# Patient Record
Sex: FEMALE | Race: WHITE | URBAN - METROPOLITAN AREA
[De-identification: names, ages, dates, MRNs, and addresses within clinical notes are randomized per-mention and may not be internally consistent; named-entity substitution may affect disease eponyms.]

---

## 2017-12-05 ENCOUNTER — THERAPY VISIT (OUTPATIENT)
Dept: PHYSICAL THERAPY | Facility: CLINIC | Age: 52
End: 2017-12-05
Payer: COMMERCIAL

## 2017-12-05 DIAGNOSIS — M25.512 LEFT SHOULDER PAIN: Primary | ICD-10-CM

## 2017-12-05 PROCEDURE — 97161 PT EVAL LOW COMPLEX 20 MIN: CPT | Mod: GP | Performed by: PHYSICAL THERAPIST

## 2017-12-05 PROCEDURE — 97110 THERAPEUTIC EXERCISES: CPT | Mod: GP | Performed by: PHYSICAL THERAPIST

## 2017-12-05 NOTE — PROGRESS NOTES
Subjective:    Patient is a 52 year old female presenting with rehab left shoulder hpi.   Bri Barrios is a 52 year old female with a left shoulder condition.  Condition occurred with:  Unknown cause.  Condition occurred: for unknown reasons.  This is a new condition  Pt reports around September 1 2017 she started to have pain in the left upper arm. This continued to increase and she sought the help of Dr Neal on 12-1-2017 and was referred to PT. She did have a MRI that indicates full thickness rotator cuff tear/AC DJD secondary to glenohumeral capsulitis. She swims and paddles and bowls. She does not have a hx of shoulder pain. She can not remember an event that triggered her pain. Her pain is mostly in the upper arm and into the upper trapezius.    Patient reports pain:  Anterior and lateral.  Radiates to:  Upper arm.  Pain is described as aching and is constant and reported as 8/10.  Associated symptoms:  Loss of motion/stiffness, loss of strength and painful arc. Pain is the same all the time.  Symptoms are exacerbated by using arm overhead, using arm at shoulder level, using arm behind back and lifting and relieved by nothing.  Since onset symptoms are gradually worsening.  Special tests:  X-ray.                                                    Objective:    System                   Shoulder Evaluation:  ROM:  AROM:    Flexion:  Left:  90        Abduction:  Left: 60                         PROM:    Flexion:  Left:  95        Extension:  Left:  50      Abduction:  Left:  60        Internal Rotation:  Left:  45 with arm at 45 dg abduction      External Rotation:  Left:  45 wiith arm at 45 dg abduction                Extension/Internal Rotation:  Left:  L5        Pain: pain with all motions    Strength:  not assessed                      Stability Testing:  not assessed      Special Tests:    Left shoulder positive for the following special tests:  Impingement    Palpation:  not assessed      Mobility  Tests:    Glenohumeral anterior left:  Hypomobile    Glenohumeral posterior left:  Hypomobile    Glenohumeral inferior left:  Hypomobile                                             General     ROS    Assessment/Plan:      Patient is a 52 year old female with left side shoulder complaints.    Patient has the following significant findings with corresponding treatment plan.                Diagnosis 1:  L shoulder pain  Pain -  hot/cold therapy and manual therapy  Decreased ROM/flexibility - manual therapy and therapeutic exercise  Decreased joint mobility - manual therapy and therapeutic exercise  Decreased strength - therapeutic exercise and therapeutic activities  Impaired muscle performance - neuro re-education  Decreased function - therapeutic activities    Therapy Evaluation Codes:   1) History comprised of:   Personal factors that impact the plan of care:      None.    Comorbidity factors that impact the plan of care are:      None.     Medications impacting care: None.  2) Examination of Body Systems comprised of:   Body structures and functions that impact the plan of care:      Shoulder.   Activity limitations that impact the plan of care are:      Lifting.  3) Clinical presentation characteristics are:   Stable/Uncomplicated.  4) Decision-Making    Low complexity using standardized patient assessment instrument and/or measureable assessment of functional outcome.  Cumulative Therapy Evaluation is: Low complexity.    Previous and current functional limitations:  (See Goal Flow Sheet for this information)    Short term and Long term goals: (See Goal Flow Sheet for this information)     Communication ability:  Patient appears to be able to clearly communicate and understand verbal and written communication and follow directions correctly.  Treatment Explanation - The following has been discussed with the patient:   RX ordered/plan of care  Anticipated outcomes  Possible risks and side effects  This patient  would benefit from PT intervention to resume normal activities.   Rehab potential is good.    Frequency:  1 X week, once daily  Duration:  for 6 weeks  Discharge Plan:  Achieve all LTG.  Independent in home treatment program.  Reach maximal therapeutic benefit.    Please refer to the daily flowsheet for treatment today, total treatment time and time spent performing 1:1 timed codes.

## 2017-12-05 NOTE — LETTER
[facility] ATHLETIC [facility] PHYSICAL [facility]  [address]  [facility] [address] [phone]    2017    Re: Bri Barrios   :   1965  MRN:  [id number]   REFERRING PHYSICIAN:   Xavier Neal    [facility]    Date of Initial Evaluation:  2017  Visits:  Rxs Used: 1  Reason for Referral:  Left shoulder pain    EVALUATION SUMMARY    Subjective:    Patient is a 52 year old female presenting with rehab left shoulder hpi.   Bri Barrios is a 52 year old female with a left shoulder condition.  Condition occurred with:  Unknown cause.  Condition occurred: for unknown reasons.  This is a new condition  Pt reports around 2017 she started to have pain in the left upper arm. This continued to increase and she sought the help of Dr Neal on 2017 and was referred to PT. She did have a MRI that indicates full thickness rotator cuff tear/AC DJD secondary to glenohumeral capsulitis. She swims and paddles and bowls. She does not have a hx of shoulder pain. She can not remember an event that triggered her pain. Her pain is mostly in the upper arm and into the upper trapezius.    Patient reports pain:  Anterior and lateral.  Radiates to:  Upper arm.  Pain is described as aching and is constant and reported as 8/10.  Associated symptoms:  Loss of motion/stiffness, loss of strength and painful arc. Pain is the same all the time.  Symptoms are exacerbated by using arm overhead, using arm at shoulder level, using arm behind back and lifting and relieved by nothing.  Since onset symptoms are gradually worsening.  Special tests:  X-ray.   Pertinent medical history includes:  History of fractures and migraines.    Other surgeries include:  Orthopedic surgery and other.  Current medications:  Anti-inflammatory.      Primary job tasks include:  Prolonged sitting, lifting and other.                      Objective:    Shoulder Evaluation:  ROM:  AROM:    Flexion:  Left:  90      Abduction:  Left: 60     PROM:    Flexion:  Left:  95        Extension:  Left:  50      Abduction:  Left:  60      Internal Rotation:  Left:  45 with arm at 45 dg abduction      External Rotation:  Left:  45 wiith arm at 45 dg abduction      Extension/Internal Rotation:  Left:  L5        Pain: pain with all motions  Strength:  not assessed  Stability Testing:  not assessed  Special Tests:    Left shoulder positive for the following special tests:  Impingement  Palpation:  not assessed  Mobility Tests:    Glenohumeral anterior left:  Hypomobile    Glenohumeral posterior left:  Hypomobile    Glenohumeral inferior left:  Hypomobile      Assessment/Plan:    Patient is a 52 year old female with left side shoulder complaints.    Patient has the following significant findings with corresponding treatment plan.                Diagnosis 1:  L shoulder pain  Pain -  hot/cold therapy and manual therapy  Decreased ROM/flexibility - manual therapy and therapeutic exercise  Decreased joint mobility - manual therapy and therapeutic exercise  Decreased strength - therapeutic exercise and therapeutic activities  Impaired muscle performance - neuro re-education  Decreased function - therapeutic activities  Therapy Evaluation Codes:   1) History comprised of:   Personal factors that impact the plan of care:      None.    Comorbidity factors that impact the plan of care are:      None.     Medications impacting care: None.  2) Examination of Body Systems comprised of:   Body structures and functions that impact the plan of care:      Shoulder.   Activity limitations that impact the plan of care are:      Lifting.  3) Clinical presentation characteristics are:   Stable/Uncomplicated.  4) Decision-Making    Low complexity using standardized patient assessment instrument and/or measureable assessment of functional outcome.  Cumulative Therapy Evaluation is:  Low complexity.  Previous and current functional limitations:  (See Goal Flow Sheet for this information)    Short term and Long term goals: (See Goal Flow Sheet for this information)   Communication ability:  Patient appears to be able to clearly communicate and understand verbal and written communication and follow directions correctly.  Treatment Explanation - The following has been discussed with the patient:   RX ordered/plan of care  Anticipated outcomes  Possible risks and side effects  This patient would benefit from PT intervention to resume normal activities.   Rehab potential is good.  Frequency:  1 X week, once daily  Duration:  for 6 weeks  Discharge Plan:  Achieve all LTG.  Independent in home treatment program.  Reach maximal therapeutic benefit.      Thank you for your referral.    INQUIRIES  Therapist: Deidra Setele, PT   [facility] PHYSICAL THERAPY  [address] [place] [address] [address]  [facility] [address]  Phone: [phone]  Fax: [phone]

## 2017-12-05 NOTE — MR AVS SNAPSHOT
After Visit Summary   12/5/2017    Bri Barrios    MRN: [id number]           Patient Information     Date Of Birth          1965        Visit Information        Provider Department      12/5/2017 12:30 PM Deidra Steele, PT [facility] Physical Therapy        Today's Diagnoses     Left shoulder pain    -  1       Follow-ups after your visit        Your next 10 appointments already scheduled     Dec 12, 2017  4:30 PM CST   YUDITH Extremity with Deidra Steele PT   [facility] Physical Therapy [facility]    [address]  [facility] [address]   [phone]            Dec 19, 2017  9:20 AM CST   YUDITH Extremity with Deidra Steele PT   [facility] Physical Therapy [facility]    [address]  [facility] [address]   [phone]            Jan 02, 2018  9:20 AM CST   YUDITH Extremity with Deidra Steele PT   [facility] Physical Therapy ([facility]    [address]  [facility] [address]   [phone]              Who to contact     If you have questions or need follow up information about today's clinic visit or your schedule please contact [place] [facility] PHYSICAL THERAPY directly at [phone].  Normal or non-critical lab and imaging results will be communicated to you by MyChart, letter or phone within 4 business days after the clinic has received the results. If you do not hear from us within 7 days, please contact the clinic through MyChart or phone. If you have a critical or abnormal lab result, we will notify you by phone as soon as possible.  Submit refill requests through [employer] or call your pharmacy and they will forward the refill request to us. Please allow 3 business days for your refill to be completed.          Additional Information About Your Visit        MyChart Information   "   [employer] lets you send messages to your doctor, view your test results, renew your prescriptions, schedule appointments and more. To sign up, go to [email or web address] . Click on \"Log in\" on the left side of the screen, which will take you to the Welcome page. Then click on \"Sign up Now\" on the right side of the page.     You will be asked to enter the access code listed below, as well as some personal information. Please follow the directions to create your username and password.     Your access code is: [email or web address]  Expires: 3/5/2018  1:29 PM     Your access code will  in 90 days. If you need help or a new code, please call your [place] clinic or [phone].        Care EveryWhere ID     This is your Care EveryWhere ID. This could be used by other organizations to access your [place] medical records  [id number]         Blood Pressure from Last 3 Encounters:   11 90/63    Weight from Last 3 Encounters:   11 80.2 kg (176 lb 11.2 oz)              We Performed the Following     HC PT EVAL, LOW COMPLEXITY     YUDITH INITIAL EVAL REPORT     THERAPEUTIC EXERCISES        Primary Care Provider Fax #    Physician No Ref-Primary [phone]       No address on file        Equal Access to Services     ENMANUEL CEJA : Bell kendallo [place], waaxda luqadaha, qaybta kaalmada adeegyada, betzy espinal . So [facility] [phone].    ATENCIÓN: Si habla español, tiene a cornelius disposición servicios gratuitos de asistencia lingüística. Llame al [phone].    We comply with applicable federal civil rights laws and [place] laws. We do not discriminate on the basis of race, color, national origin, age, disability, sex, sexual orientation, or gender identity.            Thank you!     Thank you for choosing [facility] PHYSICAL THERAP[facility]  for your care. Our goal is always to provide you with excellent care. Hearing back from our patients is one way we " can continue to improve our services. Please take a few minutes to complete the written survey that you may receive in the mail after your visit with us. Thank you!             Your Updated Medication List - Protect others around you: Learn how to safely use, store and throw away your medicines at www.disposemymeds.org.          This list is accurate as of: 12/5/17  1:29 PM.  Always use your most recent med list.                   Brand Name Dispense Instructions for use Diagnosis    ORTHO TRI-CYCLEN (28) 0.18/0.215/0.25 MG-35 MCG per tablet   Generic drug:  norgestim-eth estrad triphasic      Take  by mouth daily.        TYLENOL 325 MG tablet   Generic drug:  acetaminophen      Take 1-2 tablets by mouth every 6 hours as needed.

## 2017-12-12 ENCOUNTER — THERAPY VISIT (OUTPATIENT)
Dept: PHYSICAL THERAPY | Facility: CLINIC | Age: 52
End: 2017-12-12
Payer: COMMERCIAL

## 2017-12-12 DIAGNOSIS — M25.512 ACUTE PAIN OF LEFT SHOULDER: ICD-10-CM

## 2017-12-12 PROCEDURE — 97110 THERAPEUTIC EXERCISES: CPT | Mod: GP | Performed by: PHYSICAL THERAPIST

## 2017-12-19 ENCOUNTER — THERAPY VISIT (OUTPATIENT)
Dept: PHYSICAL THERAPY | Facility: CLINIC | Age: 52
End: 2017-12-19
Payer: COMMERCIAL

## 2017-12-19 DIAGNOSIS — M25.512 ACUTE PAIN OF LEFT SHOULDER: ICD-10-CM

## 2017-12-19 PROCEDURE — 97112 NEUROMUSCULAR REEDUCATION: CPT | Mod: GP | Performed by: PHYSICAL THERAPIST

## 2017-12-19 PROCEDURE — 97110 THERAPEUTIC EXERCISES: CPT | Mod: GP | Performed by: PHYSICAL THERAPIST

## 2018-01-02 ENCOUNTER — THERAPY VISIT (OUTPATIENT)
Dept: PHYSICAL THERAPY | Facility: CLINIC | Age: 53
End: 2018-01-02
Payer: COMMERCIAL

## 2018-01-02 DIAGNOSIS — M25.512 ACUTE PAIN OF LEFT SHOULDER: ICD-10-CM

## 2018-01-02 PROCEDURE — 97110 THERAPEUTIC EXERCISES: CPT | Mod: GP | Performed by: PHYSICAL THERAPIST

## 2018-01-02 PROCEDURE — 97112 NEUROMUSCULAR REEDUCATION: CPT | Mod: GP | Performed by: PHYSICAL THERAPIST

## 2018-01-16 ENCOUNTER — THERAPY VISIT (OUTPATIENT)
Dept: PHYSICAL THERAPY | Facility: CLINIC | Age: 53
End: 2018-01-16
Payer: COMMERCIAL

## 2018-01-16 DIAGNOSIS — M25.512 ACUTE PAIN OF LEFT SHOULDER: ICD-10-CM

## 2018-01-16 PROCEDURE — 97112 NEUROMUSCULAR REEDUCATION: CPT | Mod: GP | Performed by: PHYSICAL THERAPIST

## 2018-01-16 PROCEDURE — 97110 THERAPEUTIC EXERCISES: CPT | Mod: GP | Performed by: PHYSICAL THERAPIST

## 2018-02-13 ENCOUNTER — THERAPY VISIT (OUTPATIENT)
Dept: PHYSICAL THERAPY | Facility: CLINIC | Age: 53
End: 2018-02-13
Payer: COMMERCIAL

## 2018-02-13 DIAGNOSIS — M25.512 ACUTE PAIN OF LEFT SHOULDER: ICD-10-CM

## 2018-02-13 PROCEDURE — 97110 THERAPEUTIC EXERCISES: CPT | Mod: GP | Performed by: PHYSICAL THERAPIST

## 2018-02-13 PROCEDURE — 97112 NEUROMUSCULAR REEDUCATION: CPT | Mod: GP | Performed by: PHYSICAL THERAPIST

## 2018-02-13 NOTE — MR AVS SNAPSHOT
"              After Visit Summary   2/13/2018    Bri Barrios    MRN: [id number]           Patient Information     Date Of Birth          1965        Visit Information        Provider Department      2/13/2018 9:20 AM Deidra Steele, PT [place] [facility]        Today's Diagnoses     Acute pain of left shoulder           Follow-ups after your visit        Your next 10 appointments already scheduled     Mar 07, 2018  8:50 AM CST   YUDITH Extremity with Deidra Steele PT   [facility] Physical Therapy (YUDITH [place]  )    [address]  [facility] [address] [phone]              Who to contact     If you have questions or need follow up information about today's clinic visit or your schedule please contact [place] [facility] PHYSICAL THERAPY directly at [phone].  Normal or non-critical lab and imaging results will be communicated to you by MyChart, letter or phone within 4 business days after the clinic has received the results. If you do not hear from us within 7 days, please contact the clinic through [employer] or phone. If you have a critical or abnormal lab result, we will notify you by phone as soon as possible.  Submit refill requests through [employer] or call your pharmacy and they will forward the refill request to us. Please allow 3 business days for your refill to be completed.          Additional Information About Your Visit        [employer] Information     [employer] lets you send messages to your doctor, view your test results, renew your prescriptions, schedule appointments and more. To sign up, go to [email or web address] . Click on \"Log in\" on the left side of the screen, which will take you to the Welcome page. Then click on \"Sign up Now\" on the right side of the page.     You will be asked to enter the access code listed below, as well as some personal information. Please follow the " directions to create your username and password.     Your access code is: [email or web address]  Expires: 3/5/2018  1:29 PM     Your access code will  in 90 days. If you need help or a new code, please call your [place] clinic or [phone].        Care EveryWhere ID     This is your Care EveryWhere ID. This could be used by other organizations to access your [place] medical records  [id number]         Blood Pressure from Last 3 Encounters:   11 90/63    Weight from Last 3 Encounters:   11 80.2 kg (176 lb 11.2 oz)              We Performed the Following     YUDITH PROGRESS NOTES REPORT     NEUROMUSCULAR RE-EDUCATION     THERAPEUTIC EXERCISES        Primary Care Provider Fax #    Physician No Ref-Primary [phone]       No address on file        Equal Access to Services     [facility] : Hadii uri Cortes, waaxda luqadaha, qaybta kaalmada irish, betzy espinal . So [facility] [phone].    ATENCIÓN: Si habla español, tiene a cornelius disposición servicios gratuitos de asistencia lingüística. Llame al [phone].    We comply with applicable federal civil rights laws and [place] laws. We do not discriminate on the basis of race, color, national origin, age, disability, sex, sexual orientation, or gender identity.            Thank you!     Thank you for choosing [facility] PHYSICAL THERAPY  for your care. Our goal is always to provide you with excellent care. Hearing back from our patients is one way we can continue to improve our services. Please take a few minutes to complete the written survey that you may receive in the mail after your visit with us. Thank you!             Your Updated Medication List - Protect others around you: Learn how to safely use, store and throw away your medicines at www.disposemymeds.org.          This list is accurate as of 18 12:20 PM.  Always use your most recent med list.                   Brand  Name Dispense Instructions for use Diagnosis    ORTHO TRI-CYCLEN (28) 0.18/0.215/0.25 MG-35 MCG per tablet   Generic drug:  norgestim-eth estrad triphasic      Take  by mouth daily.        TYLENOL 325 MG tablet   Generic drug:  acetaminophen      Take 1-2 tablets by mouth every 6 hours as needed.

## 2018-02-13 NOTE — LETTER
[place] [facility] PHYSICAL [facility]  [address]  [facility] [address] [phone]    2018    Re: Bri Barrios   :   1965  MRN:  [id number]   REFERRING PHYSICIAN:   Xavier Neal        PROGRESS  REPORT  Progress reporting period is from  up65-5-48- 18.       SUBJECTIVE   Subjective: Reports slight improvement in pain. Can't swim freestyle with out pain.  Current Pain level: 3/10.     Previous pain level was  8/10  .   Changes in function:  Yes (See Goal flowsheet attached for changes in current functional level)  Adverse reaction to treatment or activity: None    OBJECTIVE  Changes noted in objective findings:  The objective findings below are from DOS 18.  Objective: No resting pain, can sleep on the left but not too long.   PROM AROM flexion 140 vs165 ER at 90 20-30 degrees  vs 90 on R, IR 45 vs 60  Slight weakness of ER and IRs and supra spinatus. Pain improving.      ASSESSMENT/PLAN  Updated problem list and treatment plan: Diagnosis 1:  L shoulder pain  Pain -  hot/cold therapy and manual therapy  Decreased ROM/flexibility - manual therapy and therapeutic exercise  Decreased joint mobility - manual therapy and therapeutic exercise  Decreased strength - therapeutic exercise and therapeutic activities  Impaired muscle performance - neuro re-education  Decreased function - therapeutic activities  STG/LTGs have been met or progress has been made towards goals:  Yes (See Goal flow sheet completed today.)  Assessment of Progress: The patient's condition is improving.  Self Management Plans:  Patient has been instructed in a home treatment program.  I have re-evaluated this patient and find that the nature, scope, duration and intensity of the therapy is appropriate for the medical condition of the patient.  Bri continues to require the following intervention to meet STG and LTG's:  PT                  Re: Bri Barrios   :    1965    Recommendations:  This patient would benefit from continued therapy.     Frequency:  2 X a month, once daily  Duration:  for 2  months    Thank you for your referral.    INQUIRIES  Therapist: Deidra Steele PT, [facility] PHYSICAL THERAPY  [address] [place] [address] [address]  [facility] [address]  Phone: [phone]  Fax: [phone]

## 2018-02-13 NOTE — LETTER
[facility] ATHLETIC [facility] PHYSICAL [facility]  [address]  [facility] [id number] [phone]    2018    Re: Bri Barrios   :   1965  MRN:  [id number]   REFERRING PHYSICIAN:   Xavier Neal    [facility] ATHLETIC [facility]    Date of Initial Evaluation:  17  Visits:  Rxs Used:   Reason for Referral:  Acute pain of left shoulder      PROGRESS  REPORT  Progress reporting period is from  - 18.       SUBJECTIVE  Subjective: Reports slight improvement in pain. Can't swim freestyle with out pain.  Current Pain level: 3/10.     Previous pain level was  8/10  .   Changes in function:  Yes (See Goal flowsheet attached for changes in current functional level)  Adverse reaction to treatment or activity: None    OBJECTIVE  Changes noted in objective findings:  The objective findings below are from DOS 18.  Objective: No resting pain, can sleep on the left but not too long.   PROM AROM flexion 140 vs165 ER at 90 20-30 degrees  vs 90 on R, IR 45 vs 60  Slight weakness of ER and IRs and supra spinatus. Pain improving.      ASSESSMENT/PLAN  Updated problem list and treatment plan: Diagnosis 1:  L shoulder pain  Pain -  hot/cold therapy and manual therapy  Decreased ROM/flexibility - manual therapy and therapeutic exercise  Decreased joint mobility - manual therapy and therapeutic exercise  Decreased strength - therapeutic exercise and therapeutic activities  Impaired muscle performance - neuro re-education  Decreased function - therapeutic activities  STG/LTGs have been met or progress has been made towards goals:  Yes (See Goal flow sheet completed today.)  Assessment of Progress: The patient's condition is improving.  Self Management Plans:  Patient has been instructed in a home treatment program.  I have re-evaluated this patient and find that the nature, scope, duration and intensity of the therapy is appropriate  for the medical condition of the patient.  Bri continues to require the following intervention to meet STG and LTG's:  PT      Re: Bri Barrios   :   1965    Recommendations:  This patient would benefit from continued therapy.     Frequency:  2 X a month, once daily  Duration:  for 2  months    Thank you for your referral.    INQUIRIES  Therapist: Deidra Steele, PT, [facility]   [facility] PHYSICAL THERAPY  [address] [place] [address] [address]  [facility] [address]  Phone: [phone]  Fax: [phone]

## 2018-02-13 NOTE — PROGRESS NOTES
Subjective:  HPI                    Objective:  System    Physical Exam    General     ROS    Assessment/Plan:    PROGRESS  REPORT    Progress reporting period is from  [id number]- 2-13-18.       SUBJECTIVE   Subjective: Reports slight improvement in pain. Can't swim freestyle with out pain.  Current Pain level: 3/10.     Previous pain level was  8/10  .   Changes in function:  Yes (See Goal flowsheet attached for changes in current functional level)  Adverse reaction to treatment or activity: None    OBJECTIVE  Changes noted in objective findings:  The objective findings below are from DOS 2-13-18.  Objective: No resting pain, can sleep on the left but not too long.   PROM AROM flexion 140 vs165 ER at 90 20-30 degrees  vs 90 on R, IR 45 vs 60  Slight weakness of ER and IRs and supra spinatus. Pain improving.      ASSESSMENT/PLAN  Updated problem list and treatment plan: Diagnosis 1:  L shoulder pain  Pain -  hot/cold therapy and manual therapy  Decreased ROM/flexibility - manual therapy and therapeutic exercise  Decreased joint mobility - manual therapy and therapeutic exercise  Decreased strength - therapeutic exercise and therapeutic activities  Impaired muscle performance - neuro re-education  Decreased function - therapeutic activities  STG/LTGs have been met or progress has been made towards goals:  Yes (See Goal flow sheet completed today.)  Assessment of Progress: The patient's condition is improving.  Self Management Plans:  Patient has been instructed in a home treatment program.  I have re-evaluated this patient and find that the nature, scope, duration and intensity of the therapy is appropriate for the medical condition of the patient.  Bri continues to require the following intervention to meet STG and LTG's:  PT    Recommendations:  This patient would benefit from continued therapy.     Frequency:  2 X a month, once daily  Duration:  for 2   months        Please refer to the daily flowsheet for  treatment today, total treatment time and time spent performing 1:1 timed codes.

## 2018-03-20 ENCOUNTER — THERAPY VISIT (OUTPATIENT)
Dept: PHYSICAL THERAPY | Facility: CLINIC | Age: 53
End: 2018-03-20
Payer: COMMERCIAL

## 2018-03-20 DIAGNOSIS — M25.512 ACUTE PAIN OF LEFT SHOULDER: ICD-10-CM

## 2018-03-20 PROCEDURE — 97112 NEUROMUSCULAR REEDUCATION: CPT | Mod: GP | Performed by: PHYSICAL THERAPIST

## 2018-03-20 PROCEDURE — 97110 THERAPEUTIC EXERCISES: CPT | Mod: GP | Performed by: PHYSICAL THERAPIST

## 2018-03-20 NOTE — LETTER
[facility] PHYSICAL [facility]  [address]  [place] [id number] [phone]    2018    Re: Bri Barrios   :   1965  MRN:  [id number]   REFERRING PHYSICIAN:   Xavier Neal    [facility]    Date of Initial Evaluation:  17  Visits:  Rxs Used:   Reason for Referral:  Acute pain of left shoulder    DISCHARGE REPORT  Progress reporting period is from 17 to 3-20-18.     SUBJECTIVE  Subjective: Has not been doing the exs very much very busy at work    Current pain level is 2/10     Previous pain level was  3/10     Changes in function:  Yes (See Goal flowsheet attached for changes in current functional level)  Adverse reaction to treatment or activity: None  OBJECTIVE  Changes noted in objective findings:  Patient has not returned to therapy so current objective findings are unknown.  The objective findings below are from DOS 3-20-18.  Objective:  AROM PROM slight restriction in flexion :150 degrees vs 165, ER 90 dg vs 95, IR 50 vs 60 dg. MMT:  Flexors 5- ER5- IR 5 Abd 4+  Slim lined the exs program for cont stretching daily  and strengthening 3x wk.  She will see MD 3-28-28 to discuss future. She may wait until the end of summer for surgery.  Pt has not returned for further PT  ASSESSMENT/PLAN  Updated problem list and treatment plan: Diagnosis 1:  L shoulder pain    STG/LTGs have been met or progress has been made towards goals:  Yes (See Goal flow sheet completed today.)  Assessment of Progress: The patient has not returned to therapy.  Current status is unknown.  Self Management Plans:  Patient has been instructed in a home treatment program.  Bri continues to require the following intervention to meet STG and LTG's:  NA  Recommendations:  DC PT    Thank you for your referral.    INQUIRIES  Therapist: [facility] Steele[facility] PH[facility]  THERAPY  [address]  [place] [address]  Phone: [phone]  Fax: [phone]

## 2018-03-20 NOTE — MR AVS SNAPSHOT
"              After Visit Summary   3/20/2018    Bri Barrios    MRN: [id number]           Patient Information     Date Of Birth          1965        Visit Information        Provider Department      3/20/2018 8:40 AM Deidra Steele, PT [facility] Physical Therapy        Today's Diagnoses     Acute pain of left shoulder           Follow-ups after your visit        Your next 10 appointments already scheduled     Apr 10, 2018  8:40 AM CDT   YUDITH Extremity with Deidra Steele PT   [facility] Physical Therapy ([place]  )    [address]  [place] [address] [phone]              Who to contact     If you have questions or need follow up information about today's clinic visit or your schedule please contact [facility] PHYSICAL THERAPY directly at [phone].  Normal or non-critical lab and imaging results will be communicated to you by fuseSPORThart, letter or phone within 4 business days after the clinic has received the results. If you do not hear from us within 7 days, please contact the clinic through fuseSPORThart or phone. If you have a critical or abnormal lab result, we will notify you by phone as soon as possible.  Submit refill requests through DGSE or call your pharmacy and they will forward the refill request to us. Please allow 3 business days for your refill to be completed.          Additional Information About Your Visit        fuseSPORThart Information     DGSE lets you send messages to your doctor, view your test results, renew your prescriptions, schedule appointments and more. To sign up, go to [email or web address] . Click on \"Log in\" on the left side of the screen, which will take you to the Welcome page. Then click on \"Sign up Now\" on the right side of the page.     You will be asked to enter the access code listed below, as well as some personal information. Please follow the " directions to create your username and password.     Your access code is: [email or web address]  Expires: 2018  9:56 AM     Your access code will  in 90 days. If you need help or a new code, please call your [facility] clinic or [phone].        Care EveryWhere ID     This is your Care EveryWhere ID. This could be used by other organizations to access your [facility] medical records  [id number]         Blood Pressure from Last 3 Encounters:   11 90/63    Weight from Last 3 Encounters:   11 80.2 kg (176 lb 11.2 oz)              We Performed the Following     NEUROMUSCULAR RE-EDUCATION     THERAPEUTIC EXERCISES        Primary Care Provider Fax #    Physician No Ref-Primary [phone]       No address on file        Equal Access to Services     ENMANUEL CEJA : Bell Cortes, waaxyuriy luqadaha, qaybta kaalmada adetanviryayuriy, betzy espinal . So [facility] [phone].    ATENCIÓN: Si habla español, tiene a cornelius disposición servicios gratuitos de asistencia lingüística. Llame al [phone].    We comply with applicable federal civil rights laws and [place] laws. We do not discriminate on the basis of race, color, national origin, age, disability, sex, sexual orientation, or gender identity.            Thank you!     Thank you for choosing [facility] PHYSICAL THERAPY  for your care. Our goal is always to provide you with excellent care. Hearing back from our patients is one way we can continue to improve our services. Please take a few minutes to complete the written survey that you may receive in the mail after your visit with us. Thank you!             Your Updated Medication List - Protect others around you: Learn how to safely use, store and throw away your medicines at www.disposemymeds.org.          This list is accurate as of 3/20/18  9:56 AM.  Always use your most recent med list.                   Brand Name Dispense Instructions for  use Diagnosis    ORTHO TRI-CYCLEN (28) 0.18/0.215/0.25 MG-35 MCG per tablet   Generic drug:  norgestim-eth estrad triphasic      Take  by mouth daily.        TYLENOL 325 MG tablet   Generic drug:  acetaminophen      Take 1-2 tablets by mouth every 6 hours as needed.

## 2018-06-12 PROBLEM — M25.512 LEFT SHOULDER PAIN: Status: RESOLVED | Noted: 2017-12-05 | Resolved: 2018-06-12

## 2018-06-12 NOTE — PROGRESS NOTES
Subjective:  HPI                    Objective:  System    Physical Exam    General     ROS    Assessment/Plan:    DISCHARGE REPORT    Progress reporting period is from 12-5-17 to 3-20-18.       SUBJECTIVE    Subjective: Has not been doing the exs very much very busy at work    Current pain level is 2/10  .     Previous pain level was  3/10  .   Changes in function:  Yes (See Goal flowsheet attached for changes in current functional level)  Adverse reaction to treatment or activity: None    OBJECTIVE  Changes noted in objective findings:  Patient has not returned to therapy so current objective findings are unknown. and The objective findings below are from DOS 3-20-18.  Objective:  AROM PROM slight restriction in flexion :150 degrees vs 165, ER 90 dg vs 95, IR 50 vs 60 dg. MMT : flexors 5- ER5- IR 5 Abd 4+  Slim lined the exs program for cont stretching daily  and strengthening 3x wk  . She will see MD 3-28-28 to discuss future. She may wait until the end of summer for surgery. Pt has not returned for further PT    ASSESSMENT/PLAN  Updated problem list and treatment plan: Diagnosis 1:  L shoulder pain    STG/LTGs have been met or progress has been made towards goals:  Yes (See Goal flow sheet completed today.)  Assessment of Progress: The patient has not returned to therapy. Current status is unknown.  Self Management Plans:  Patient has been instructed in a home treatment program.    Bri continues to require the following intervention to meet STG and LTG's:  NA    Recommendations:  DC PT    Please refer to the daily flowsheet for treatment today, total treatment time and time spent performing 1:1 timed codes.